# Patient Record
Sex: FEMALE
[De-identification: names, ages, dates, MRNs, and addresses within clinical notes are randomized per-mention and may not be internally consistent; named-entity substitution may affect disease eponyms.]

---

## 2017-12-27 ENCOUNTER — RESULT REVIEW (OUTPATIENT)
Age: 47
End: 2017-12-27

## 2018-01-12 ENCOUNTER — APPOINTMENT (OUTPATIENT)
Dept: OPHTHALMOLOGY | Facility: CLINIC | Age: 48
End: 2018-01-12

## 2018-01-16 ENCOUNTER — APPOINTMENT (OUTPATIENT)
Dept: OPHTHALMOLOGY | Facility: CLINIC | Age: 48
End: 2018-01-16
Payer: MEDICAID

## 2018-01-16 PROCEDURE — 92004 COMPRE OPH EXAM NEW PT 1/>: CPT

## 2018-01-16 PROCEDURE — 92225: CPT | Mod: RT

## 2018-01-16 PROCEDURE — 92015 DETERMINE REFRACTIVE STATE: CPT

## 2018-01-23 ENCOUNTER — APPOINTMENT (OUTPATIENT)
Dept: OPHTHALMOLOGY | Facility: CLINIC | Age: 48
End: 2018-01-23
Payer: MEDICAID

## 2018-01-23 PROCEDURE — 92225: CPT | Mod: LT

## 2018-01-23 PROCEDURE — 76514 ECHO EXAM OF EYE THICKNESS: CPT

## 2018-01-23 PROCEDURE — 92083 EXTENDED VISUAL FIELD XM: CPT

## 2018-01-23 PROCEDURE — 92015 DETERMINE REFRACTIVE STATE: CPT

## 2018-01-23 PROCEDURE — 92004 COMPRE OPH EXAM NEW PT 1/>: CPT

## 2018-01-23 PROCEDURE — 92133 CPTRZD OPH DX IMG PST SGM ON: CPT

## 2018-01-23 PROCEDURE — 92012 INTRM OPH EXAM EST PATIENT: CPT

## 2018-01-23 PROCEDURE — 92225: CPT | Mod: RT

## 2021-02-10 ENCOUNTER — RESULT REVIEW (OUTPATIENT)
Age: 51
End: 2021-02-10

## 2021-08-18 ENCOUNTER — RESULT REVIEW (OUTPATIENT)
Age: 51
End: 2021-08-18

## 2022-06-28 ENCOUNTER — APPOINTMENT (OUTPATIENT)
Dept: ORTHOPEDIC SURGERY | Facility: CLINIC | Age: 52
End: 2022-06-28
Payer: MEDICAID

## 2022-06-28 VITALS — BODY MASS INDEX: 32.36 KG/M2 | WEIGHT: 150 LBS | HEIGHT: 57 IN

## 2022-06-28 DIAGNOSIS — E78.00 PURE HYPERCHOLESTEROLEMIA, UNSPECIFIED: ICD-10-CM

## 2022-06-28 PROCEDURE — 99204 OFFICE O/P NEW MOD 45 MIN: CPT

## 2022-06-28 PROCEDURE — 73080 X-RAY EXAM OF ELBOW: CPT | Mod: RT

## 2022-06-28 NOTE — ASSESSMENT
[FreeTextEntry1] : The condition was explained to the patient - concern for occult radial neck fracture.\par - MRI R elbow to evaluate for occult fx.\par - removed long arm splint. sling PRN.\par - recommend OTC pain medications such as Tylenol and NSAIDs as needed, provided there are no contra-indicated medical conditions (eg liver disease, kidney disease, or GI ulcer/bleeding) or medications (eg blood thinners). Discussed possible GI and blood pressure side effects.\par - NWB to RUE.\par \par F/u after MRI.

## 2022-06-28 NOTE — IMAGING
[Right] : right elbow [de-identified] : RIGHT ELBOW\par skin intact. mild swelling of elbow.\par TTP diffusely to elbow.\par elbow ROM: extension -40, flexion 110. limited pronation, good supination. \par good wrist extension, flexion.\par good digital flexion and extension.\par Sensation intact to light touch.\par palpable radial pulse. [FreeTextEntry1] : [facility] displaced fracture or dislocation.  linear calcification of soft tissues of medial elbow.\par XR today: mild angular deformity of radial head without apparent fracture. linear calcification of soft tissues of medial elbow.

## 2022-06-28 NOTE — HISTORY OF PRESENT ILLNESS
[8] : 8 [5] : 5 [Sharp] : sharp [Intermittent] : intermittent [Rest] : rest [Meds] : meds [Lying in bed] : lying in bed [de-identified] : 6/28/22: 50yo LHD F (currently OOW s/p WC injury) presents for RIGHT elbow pain after she slipped and fell onto the RIGHT side on 6/19/22.\[facility], applied long arm splint, sling.\par Taking Motrin 600mg, Tylenol PRN.\par Denies prior injury to RUE.\par \par Hx: high cholesterol. GI ulcer. [] : no [FreeTextEntry1] : right elbow [FreeTextEntry7] : right forearm  [FreeTextEntry9] : Tylenol, Motrin, splint and sling [de-identified] : activities [de-identified] : X-ray at urgent care

## 2022-06-28 NOTE — CONSULT LETTER
[Dear  ___] : Dear  [unfilled], [Consult Letter:] : I had the pleasure of evaluating your patient, [unfilled]. [Please see my note below.] : Please see my note below. [Consult Closing:] : Thank you very much for allowing me to participate in the care of this patient.  If you have any questions, please do not hesitate to contact me. [Sincerely,] : Sincerely, [FreeTextEntry3] : Estefani Chen MD

## 2022-07-12 ENCOUNTER — APPOINTMENT (OUTPATIENT)
Dept: ORTHOPEDIC SURGERY | Facility: CLINIC | Age: 52
End: 2022-07-12

## 2022-07-12 VITALS — HEIGHT: 57 IN | WEIGHT: 150 LBS | BODY MASS INDEX: 32.36 KG/M2

## 2022-07-12 PROCEDURE — 99214 OFFICE O/P EST MOD 30 MIN: CPT

## 2022-07-13 NOTE — IMAGING
[Right] : right elbow [FreeTextEntry1] : [facility] no [facility] displaced fracture or dislocation.  linear calcification of soft tissues of medial elbow.\par XR today: mild angular deformity of radial head without apparent fracture. linear calcification of soft tissues of medial elbow. [de-identified] : RIGHT ELBOW\par skin intact. mild swelling of elbow.\par TTP diffusely to elbow.\par elbow ROM: extension -25, flexion 100. limited pronation, good supination. \par good wrist extension, flexion.\par good digital flexion and extension.\par Sensation intact to light touch.\par palpable radial pulse.

## 2022-07-13 NOTE — ASSESSMENT
[FreeTextEntry1] : - wean sling.\par - prescribed PT for R elbow. continue HEP.\par - NWB to RUE.\par \par F/u 3wks. X-rays R elbow.

## 2022-07-13 NOTE — HISTORY OF PRESENT ILLNESS
[9] : 9 [8] : 8 [Dull/Aching] : dull/aching [Sharp] : sharp [Intermittent] : intermittent [Rest] : rest [Meds] : meds [Lying in bed] : lying in bed [de-identified] : 7/12/22: f/u MRI R elbow.\par \par MRI R elbow 7/5/22 - IMPRESSION:\par 1. Acute Nondisplaced 5 mm fracture of the coronoid process of the olecranon. Acute nondepressed subchondral impaction fracture of the central and posterior capitellum. Acute nondepressed nondisplaced fracture the radial head and neck. Large joint effusion\par \par 6/28/22: 50yo LHD F (currently OOW s/p WC injury) presents for RIGHT elbow pain after she slipped and fell onto the RIGHT side on 6/19/22.\[facility], applied long arm splint, sling.\par Taking Motrin 600mg, Tylenol PRN.\par Denies prior injury to RUE.\par \par Hx: high cholesterol. GI ulcer. [] : no [FreeTextEntry1] : right elbow [FreeTextEntry9] : Tylenol, Motrin, splint and sling [de-identified] : activities and  pressure [de-identified] : X-ray and MRI

## 2022-08-02 ENCOUNTER — APPOINTMENT (OUTPATIENT)
Dept: ORTHOPEDIC SURGERY | Facility: CLINIC | Age: 52
End: 2022-08-02

## 2022-08-02 VITALS — HEIGHT: 57 IN | BODY MASS INDEX: 32.36 KG/M2 | WEIGHT: 150 LBS

## 2022-08-02 DIAGNOSIS — S52.044A NONDISPLACED FRACTURE OF CORONOID PROCESS OF RIGHT ULNA, INITIAL ENCOUNTER FOR CLOSED FRACTURE: ICD-10-CM

## 2022-08-02 DIAGNOSIS — S52.124A NONDISPLACED FRACTURE OF HEAD OF RIGHT RADIUS, INITIAL ENCOUNTER FOR CLOSED FRACTURE: ICD-10-CM

## 2022-08-02 PROCEDURE — 99213 OFFICE O/P EST LOW 20 MIN: CPT

## 2022-08-02 PROCEDURE — 73080 X-RAY EXAM OF ELBOW: CPT | Mod: RT

## 2022-08-02 NOTE — HISTORY OF PRESENT ILLNESS
[de-identified] : 8/2/22: 6wks s/p RIGHT radial head fx, coronoid fx 6/19/22. pain much better. +PT 2x/wk and performing HEP.\par \par 7/12/22: f/u MRI R elbow.\par \par MRI R elbow 7/5/22 - IMPRESSION:\par 1. Acute Nondisplaced 5 mm fracture of the coronoid process of the olecranon. Acute nondepressed subchondral impaction fracture of the central and posterior capitellum. Acute nondepressed nondisplaced fracture the radial head and neck. Large joint effusion\par \par 6/28/22: 52yo LHD F (currently OOW s/p WC injury) presents for RIGHT elbow pain after she slipped and fell onto the RIGHT side on 6/19/22.\[facility], applied long arm splint, sling.\par Taking Motrin 600mg, Tylenol PRN.\par Denies prior injury to RUE.\par \par Hx: high cholesterol. GI ulcer. [] : no [FreeTextEntry1] : R elbow  [FreeTextEntry5] : Pt here for R elbow, has seen improvement since last visit , along with doing PT two times a week \par pt states states the site is still a little tender with certain movements  [de-identified] : 08/02/2022 [de-identified] : Physical Therapy

## 2022-08-02 NOTE — IMAGING
[de-identified] : RIGHT ELBOW\par skin intact. no swelling.\par min TTP to radial head.\par good elbow extension, flexion. mild limited pronation, good supination.\par good wrist extension, flexion.\par good digital flexion and extension.\par Sensation intact to light touch.\par palpable radial pulse. [Right] : right elbow [FreeTextEntry1] : stable position/alignment of radial head fx, interval healing.

## 2022-08-02 NOTE — ASSESSMENT
[FreeTextEntry1] : - renewed PT for R elbow. continue HEP.\par - activity as tolerated.\par \par F/u 6wks. X-rays R elbow.

## 2022-09-13 ENCOUNTER — APPOINTMENT (OUTPATIENT)
Dept: ORTHOPEDIC SURGERY | Facility: CLINIC | Age: 52
End: 2022-09-13

## 2023-03-07 ENCOUNTER — APPOINTMENT (OUTPATIENT)
Dept: ORTHOPEDIC SURGERY | Facility: CLINIC | Age: 53
End: 2023-03-07
Payer: MEDICAID

## 2023-03-07 PROCEDURE — 99213 OFFICE O/P EST LOW 20 MIN: CPT

## 2023-03-07 PROCEDURE — 73130 X-RAY EXAM OF HAND: CPT | Mod: 50

## 2023-03-07 NOTE — HISTORY OF PRESENT ILLNESS
[5] : 5 [Dull/Aching] : dull/aching [Throbbing] : throbbing [Constant] : constant [Meds] : meds [Ice] : ice [Heat] : heat [Bending forward] : bending forward [Extending back] : extending back [de-identified] : 3/7/23: 53yo LHD female (disabled due to ankle injury 2015) presents for:\par - BILATERAL hand pain, weakness, and numbness/tingling, on and off since a fall down the stairs in 2015. Numbness is intermittent, occurs when using her phone and when sleeping. Dropping things. \par - unable to bend BILATERAL thumbs x 1 month. Thumb locks down when she is able to bend it.\par Denies recent injury.\par Using Lidocaine patch and IcyHot without relief.\par \par Last seen 8/2/22 for RIGHT radial head fx, coronoid fx from fall on 6/19/22\par \par Hx: GI ulcer. HLD. [] : no [FreeTextEntry5] : 52 year old F is here for Bilateral Hands, pt states having weakness by her joints on her Hands, states having issues mainly with her Bilateral thumbs, unable to bend them, at night having to wake up because her Bilateral hands are numb and tingling. swelling on the Bilateral Hands. using lidocaine patches and icy hot but no relief.

## 2023-03-07 NOTE — IMAGING
[de-identified] : LEFT HAND\par skin intact. no swelling.\par TTP to thumb A1 pulley.\par good wrist extension, flexion.\par good EPL, flicker FPL. good finger extension, flex to full fist. good finger abduction and adduction. \par SILT to median, ulnar, radial distributions.\par palpable radial pulse, brisk cap refill all digits.\par  4/5, 1st DI 5/5, APB 4+/5.\par no triggering.\par negative Tinel's at carpal tunnel. negative Phalen's test.\par \par \par RIGHT HAND\par skin intact. no swelling.\par TTP to thumb A1 pulley.\par good wrist extension, flexion.\par good EPL, flicker FPL. good finger extension, flex to full fist. good finger abduction and adduction. \par SILT to median, ulnar, radial distributions.\par palpable radial pulse, brisk cap refill all digits.\par  4/5, 1st DI 5/5, APB 5/5.\par no triggering.\par negative Tinel's at carpal tunnel. negative Phalen's test.\par \par \par XRAYS OF BILATERAL HANDS: no acute displaced fracture or dislocation.

## 2023-03-07 NOTE — ASSESSMENT
[FreeTextEntry1] : The condition was explained to the patient.\par \par Discussed risks and benefits of treatment options for tenosynovitis - activity modification, NSAID, splint, steroid injection, or surgery.\par Patient declined CSI at this time.\par \par - recommend bilateral carpal tunnel night splint.\par - activity modification PRN.\par \par F/u 1wk.\par

## 2023-03-14 ENCOUNTER — APPOINTMENT (OUTPATIENT)
Dept: ORTHOPEDIC SURGERY | Facility: CLINIC | Age: 53
End: 2023-03-14
Payer: MEDICAID

## 2023-03-14 PROCEDURE — 20550 NJX 1 TENDON SHEATH/LIGAMENT: CPT | Mod: 50

## 2023-03-14 PROCEDURE — 99214 OFFICE O/P EST MOD 30 MIN: CPT | Mod: 25

## 2023-03-14 NOTE — ASSESSMENT
[FreeTextEntry1] : Patient would like to proceed with CSI for trigger finger.\par - Discussed risks, benefits, and alternatives as well as contents of injection. Risks include, but are not limited allergic reaction, flare reaction, injection site pain, bruising, numbness, increased blood sugar, skin discoloration, fat atrophy, tendon rupture, and infection. Risk of immune suppression and increased susceptibility to infection with steroid use. We discussed that too many injections may lead to weakening of the tendon and tendon rupture. Patient expressed understanding and would like to proceed with injection.\par - The skin over the LEFT thumb A1 pulley was cleansed with alcohol and anesthetized with ethyl chloride. The flexor sheath was injected with 3mg of celestone, 0.5cc of 1% lidocaine. Site was dressed with a band-aid. Patient tolerated the procedure well.\par - The skin over the RIGHT thumb A1 pulley was cleansed with alcohol and anesthetized with ethyl chloride. The flexor sheath was injected with 3mg of celestone, 0.5cc of 1% lidocaine. Site was dressed with a band-aid. Patient tolerated the procedure well.\par - discussed that it may take up to 1 week for symptoms to improve after CSI.\par \par - bilateral carpal tunnel night splint.\par - activity modification PRN.\par \par F/u 4wks.

## 2023-03-14 NOTE — HISTORY OF PRESENT ILLNESS
[7] : 7 [Dull/Aching] : dull/aching [Throbbing] : throbbing [Constant] : constant [Meds] : meds [Ice] : ice [Heat] : heat [Bending forward] : bending forward [Extending back] : extending back [de-identified] : 3/14/23:\par - f/u BILATERAL CTS. ordered night splints, should arrive today.\par - f/u BILATERAL trigger thumb. pain worse. dropping things.\par \par 3/7/23: 53yo LHD female (disabled due to ankle injury 2015) presents for:\par - BILATERAL hand pain, weakness, and numbness/tingling, on and off since a fall down the stairs in 2015. Numbness is intermittent, occurs when using her phone and when sleeping. Dropping things. \par - unable to bend BILATERAL thumbs x 1 month. Thumb locks down when she is able to bend it.\par Denies recent injury.\par Using Lidocaine patch and IcyHot without relief.\par \par Last seen 8/2/22 for RIGHT radial head fx, coronoid fx from fall on 6/19/22\par \par Hx: GI ulcer. HLD. [] : no [FreeTextEntry1] : bilateral hand [FreeTextEntry5] : AGUSTO is here today for bilateral hand pain. she states is worse since last visit. she is here today for b/l CSI.

## 2023-03-14 NOTE — IMAGING
[de-identified] : LEFT HAND\par skin intact. no swelling.\par TTP to thumb A1 pulley.\par good wrist extension, flexion.\par good EPL, flicker FPL. good finger extension, flex to full fist. good finger abduction and adduction. \par SILT to median, ulnar, radial distributions.\par palpable radial pulse, brisk cap refill all digits.\par  4/5, 1st DI 5/5, APB 4+/5.\par no triggering.\par negative Tinel's at carpal tunnel. negative Phalen's test.\par \par \par RIGHT HAND\par skin intact. no swelling.\par TTP to thumb A1 pulley.\par good wrist extension, flexion.\par good EPL, flicker FPL. good finger extension, flex to full fist. good finger abduction and adduction. \par SILT to median, ulnar, radial distributions.\par palpable radial pulse, brisk cap refill all digits.\par  4/5, 1st DI 5/5, APB 5/5.\par no triggering.\par negative Tinel's at carpal tunnel. negative Phalen's test.

## 2023-04-11 ENCOUNTER — APPOINTMENT (OUTPATIENT)
Dept: ORTHOPEDIC SURGERY | Facility: CLINIC | Age: 53
End: 2023-04-11
Payer: MEDICAID

## 2023-04-11 PROCEDURE — 99213 OFFICE O/P EST LOW 20 MIN: CPT

## 2023-04-11 PROCEDURE — 95864 NEEDLE EMG 4 EXTREMITIES: CPT

## 2023-04-11 NOTE — IMAGING
[de-identified] : LEFT HAND\par skin intact. no swelling.\par no TTP.\par good wrist extension, flexion.\par good EPL, FPL. good finger extension, flex to full fist. good finger abduction and adduction. \par SILT to median, ulnar, radial distributions.\par palpable radial pulse, brisk cap refill all digits.\par  4/5, 1st DI 5/5, APB 4+/5.\par + clicking with thumb IPJ flexion.\par + Tinel's at carpal tunnel.\par \par \par RIGHT HAND\par skin intact. no swelling.\par TTP to thumb A1 pulley.\par good wrist extension, flexion.\par good EPL, flicker FPL. good finger extension, flex to full fist. good finger abduction and adduction. \par SILT to median, ulnar, radial distributions.\par palpable radial pulse, brisk cap refill all digits.\par  4/5, 1st DI 5/5, APB 5/5.\par no triggering.\par negative Tinel's at carpal tunnel. negative Phalen's test.

## 2023-04-11 NOTE — ASSESSMENT
[FreeTextEntry1] : - EDX to evaluate CTS.\par - continue carpal tunnel night splint.\par - activity modification PRN.\par \par F/u after EDX. She will bring the results of the EDX.

## 2023-04-11 NOTE — HISTORY OF PRESENT ILLNESS
[Tightness] : tightness [de-identified] : 4/11/23:\par - f/u BILATERAL CTS. wearing night splint. continues to have numbness most of the day and at night.\par - f/u BILATERAL trigger thumb. s/p CSI on 3/14/23. LEFT side is much better, RIGHT side still painful and locking.\par \par 3/14/23:\par - f/u BILATERAL CTS. ordered night splints, should arrive today.\par - f/u BILATERAL trigger thumb. pain worse. dropping things.\par \par 3/7/23: 53yo LHD female (disabled due to ankle injury 2015) presents for:\par - BILATERAL hand pain, weakness, and numbness/tingling, on and off since a fall down the stairs in 2015. Numbness is intermittent, occurs when using her phone and when sleeping. Dropping things. \par - unable to bend BILATERAL thumbs x 1 month. Thumb locks down when she is able to bend it.\par Denies recent injury.\par Using Lidocaine patch and IcyHot without relief.\par \par Last seen 8/2/22 for RIGHT radial head fx, coronoid fx from fall on 6/19/22\par \par Hx: GI ulcer. HLD. [] : no [FreeTextEntry1] : bilateral hands  [FreeTextEntry5] : AGUSTO a 52 year y/o female is here today for bilateral hand follow up. left thumb pain decreased, right thumb pain is the same, locking at times. pain with bending. N/T in bilateral hands.

## 2023-06-06 ENCOUNTER — APPOINTMENT (OUTPATIENT)
Dept: ORTHOPEDIC SURGERY | Facility: CLINIC | Age: 53
End: 2023-06-06

## 2023-06-20 ENCOUNTER — APPOINTMENT (OUTPATIENT)
Dept: ORTHOPEDIC SURGERY | Facility: CLINIC | Age: 53
End: 2023-06-20
Payer: MEDICAID

## 2023-06-20 PROCEDURE — 99213 OFFICE O/P EST LOW 20 MIN: CPT

## 2023-06-20 NOTE — HISTORY OF PRESENT ILLNESS
[de-identified] : 6/20/23: \par - f/u EDX. f/u BILATERAL CTS. wearing night splint. reports numbness is better, has mild tingling at night.\par - f/u BILATERAL trigger thumb. s/p CSI on 3/14/23.\par Reports that she was recently hospitalized for pneumonia/bronchitis/asthma, for which she was prescribed a 7 day course of prednisone, which has improved the pain in her thumbs. Completed prednisone yesterday.  Able to perform ADLs and lift things.\par \par EDX 5/15/23 - IMPRESSION: \par - L median: DML 3.0ms, DSL 2.7ms.\par - R median: DML 2.7ms, DSL 2.5ms.\par - L ulnar: 77m/s above to below elbow, 49m/s below elbow to wrist.\par - R ulnar: 91m/s above to below elbow, 51m/s below elbow to wrist.\par \par 4/11/23:\par - f/u BILATERAL CTS. wearing night splint. continues to have numbness most of the day and at night.\par - f/u BILATERAL trigger thumb. s/p CSI on 3/14/23. LEFT side is much better, RIGHT side still painful and locking.\par \par 3/14/23:\par - f/u BILATERAL CTS. ordered night splints, should arrive today.\par - f/u BILATERAL trigger thumb. pain worse. dropping things.\par \par 3/7/23: 53yo LHD female (disabled due to ankle injury 2015) presents for:\par - BILATERAL hand pain, weakness, and numbness/tingling, on and off since a fall down the stairs in 2015. Numbness is intermittent, occurs when using her phone and when sleeping. Dropping things. \par - unable to bend BILATERAL thumbs x 1 month. Thumb locks down when she is able to bend it.\par Denies recent injury.\par Using Lidocaine patch and IcyHot without relief.\par \par Last seen 8/2/22 for RIGHT radial head fx, coronoid fx from fall on 6/19/22\par \par Hx: GI ulcer. HLD. [] : no [FreeTextEntry1] : BILATERAL hand [FreeTextEntry5] : AGUSTO is here today for EMG results. pt states she was recently hospitalized (5 days) for pneumonia, given prednisone which helped relieve symptoms in thumbs. she is using night splint which helps with N/T.

## 2023-06-20 NOTE — IMAGING
[de-identified] : LEFT HAND\par skin intact. no swelling.\par mild TTP to thumb A1 pulley.\par good wrist extension, flexion.\par good EPL, FPL. good finger extension, flex to full fist. good finger abduction and adduction. \par SILT to median, ulnar, radial distributions.\par palpable radial pulse, brisk cap refill all digits.\par  4+/5, 1st DI 5/5, APB 4+/5.\par + clicking with thumb IPJ flexion.\par negative Tinel's at carpal tunnel.\par \par \par RIGHT HAND\par skin intact. no swelling.\par mild TTP to thumb A1 pulley.\par good wrist extension, flexion.\par good EPL, flicker FPL. good finger extension, flex to full fist. good finger abduction and adduction. \par SILT to median, ulnar, radial distributions.\par palpable radial pulse, brisk cap refill all digits.\par  4+/5, 1st DI 5/5, APB 5/5.\par + clicking with thumb IPJ flexion.\par negative Tinel's at carpal tunnel.

## 2023-06-20 NOTE — ASSESSMENT
[FreeTextEntry1] : EDX reviewed with patient.\par - continue carpal tunnel night splint.\par - activity modification PRN.\par \par F/u if symptoms persist.

## 2023-07-26 NOTE — REVIEW OF SYSTEMS
[Joint Pain] : joint pain [Joint Swelling] : joint swelling [Negative] : Heme/Lymph [Joint Stiffness] : no joint stiffness colon surgery

## 2023-10-03 ENCOUNTER — APPOINTMENT (OUTPATIENT)
Dept: ORTHOPEDIC SURGERY | Facility: CLINIC | Age: 53
End: 2023-10-03
Payer: MEDICAID

## 2023-10-03 PROCEDURE — 20526 THER INJECTION CARP TUNNEL: CPT | Mod: RT

## 2023-10-03 PROCEDURE — 73080 X-RAY EXAM OF ELBOW: CPT | Mod: RT

## 2023-10-03 PROCEDURE — 99214 OFFICE O/P EST MOD 30 MIN: CPT | Mod: 25

## 2023-10-17 ENCOUNTER — APPOINTMENT (OUTPATIENT)
Dept: ORTHOPEDIC SURGERY | Facility: CLINIC | Age: 53
End: 2023-10-17
Payer: MEDICAID

## 2023-10-17 DIAGNOSIS — M77.8 OTHER ENTHESOPATHIES, NOT ELSEWHERE CLASSIFIED: ICD-10-CM

## 2023-10-17 DIAGNOSIS — M65.312 TRIGGER THUMB, LEFT THUMB: ICD-10-CM

## 2023-10-17 DIAGNOSIS — G56.01 CARPAL TUNNEL SYNDROME, RIGHT UPPER LIMB: ICD-10-CM

## 2023-10-17 DIAGNOSIS — G56.02 CARPAL TUNNEL SYNDROME, LEFT UPPER LIMB: ICD-10-CM

## 2023-10-17 DIAGNOSIS — M65.311 TRIGGER THUMB, RIGHT THUMB: ICD-10-CM

## 2023-10-17 PROCEDURE — 99213 OFFICE O/P EST LOW 20 MIN: CPT

## 2023-11-08 ENCOUNTER — APPOINTMENT (OUTPATIENT)
Dept: ORTHOPEDIC SURGERY | Facility: CLINIC | Age: 53
End: 2023-11-08
Payer: MEDICAID

## 2023-11-08 DIAGNOSIS — Z00.00 ENCOUNTER FOR GENERAL ADULT MEDICAL EXAMINATION W/OUT ABNORMAL FINDINGS: ICD-10-CM

## 2023-11-08 DIAGNOSIS — M25.521 PAIN IN RIGHT ELBOW: ICD-10-CM

## 2023-11-08 PROCEDURE — 99204 OFFICE O/P NEW MOD 45 MIN: CPT

## 2023-11-08 PROCEDURE — 99214 OFFICE O/P EST MOD 30 MIN: CPT

## 2023-11-08 PROCEDURE — 73030 X-RAY EXAM OF SHOULDER: CPT | Mod: RT

## 2023-12-13 ENCOUNTER — APPOINTMENT (OUTPATIENT)
Dept: ORTHOPEDIC SURGERY | Facility: CLINIC | Age: 53
End: 2023-12-13
Payer: MEDICAID

## 2023-12-13 DIAGNOSIS — M75.101 UNSPECIFIED ROTATOR CUFF TEAR OR RUPTURE OF RIGHT SHOULDER, NOT SPECIFIED AS TRAUMATIC: ICD-10-CM

## 2023-12-13 DIAGNOSIS — M75.41 IMPINGEMENT SYNDROME OF RIGHT SHOULDER: ICD-10-CM

## 2023-12-13 DIAGNOSIS — M75.111 INCOMPLETE ROTATOR CUFF TEAR OR RUPTURE OF RIGHT SHOULDER, NOT SPECIFIED AS TRAUMATIC: ICD-10-CM

## 2023-12-13 DIAGNOSIS — M75.21 BICIPITAL TENDINITIS, RIGHT SHOULDER: ICD-10-CM

## 2023-12-13 PROCEDURE — 99214 OFFICE O/P EST MOD 30 MIN: CPT

## 2023-12-13 NOTE — DISCUSSION/SUMMARY
[de-identified] : Assessment:   The patient presents with history, examination and imaging that are most consistent with a diagnosis of:  Nontraumatic incomplete tear of right rotator cuff, rotator cuff syndrome of right shoulder   The patient would like to pursue conservative measures at this time. After consideration of various non-operative treatment modalities, the patient would like to proceed with the modalities listed below.   During this appointment the patient was examined, diagnoses were discussed and explained in a face to face manner. In addition extensive time was spent reviewing aforementioned diagnostic studies. Counseling including abnormal image results, differential diagnoses, treatment options, risk and benefits, lifestyle changes, current condition, and current medications was performed. Patient's comments, questions, and concerns were address and patient verbalized understanding.   ---------------------------     Plan: - Continue with Physical Therapy, new script provided     Follow-up:  as needed

## 2023-12-13 NOTE — IMAGING
[de-identified] : The patient is a well appearing 53 year  old female of their stated age.   General: in no acute distress, seated comfortably, moving easily Skin: No discoloration, rashes; on palpation skin is dry,  Neuro: Normal sensation all dermatomes, motor all myotomes Vascular: Normal pulses, no edema, normal temperature Coordination and balance: Normal Psych: normal mood and affect, non pressured speech, alert and oriented x3  Musculoskeletal:  Neck is supple & nontender to palpation. Negative Spurling's test.   Right Shoulder:       	   ROM:  Forward Flexion: 180 degrees  Abduction: 180 degrees  ER at 90: 90 degrees  IR at 90: 45 degrees  ER at N: 50 degrees   Motor:  Abduction: 5 out of 5  FPS: 5 out of 5  Flexion: 5 out of 5  Internal Rotation: 5 out of 5  External Rotation: 5 out of 5   Provocative Testing:  Impingement: Negative  [place]'s: Negative  Other: N/A   Left Shoulder:  ROM:  Forward Flexion: 180 degrees  Abduction: 180 degrees  ER at 90: 90 degrees  IR at 90: 45 degrees  ER at N: 50 degrees   Motor:  Abduction: 5 out of 5  FPS: 5 out of 5  Flexion: 5 out of 5  Internal Rotation: 5 out of 5  External Rotation: 5 out of 5   Provocative Testing:  Impingement: Negative  [place]'s: Negative  Other: N/A   Effected Elbow:   ROM:  Flexion: 0-145 degrees  Supination: 90 degrees  Pronation: 90 degrees   Inspection:  Erythema: None  Ecchymosis: None  Abrasions: None  Effusion: None  Deformity: None   Palpation:  Crepitus: None  Medial Epicondyle/Flexor-Pronator: Nontender  Lateral Epicondyle/ECRB: Swelling Olecranon: Nontender  Radial Head: Nontender  Distal Biceps: Tenderness  Distal Triceps:  Nontender  Flexor-Pronator: Nontender  Extensor/ECRB: Nontender  UCL: Nontender  Pronator Intersection: Nontender  Ulnar Nerve:  Stable & Nontender   Stress Testing:  Varus at 0 Degrees: Stable  Varus at 30 Degrees: Stable  Valgus at 0 Degrees: Stable  Valgus at 30 Degrees: Stable   Motor:  Elbow Flexion: 5 out of 5  Elbow Extension: 5 out of 5  Supination: 5 out of 5  Pronation: 5 out of 5  Wrist Flexion: 5 out of 5  Wrist Extension: 5 out of 5  Interossei: 5 out of 5  : 5 out of 5   Provocative Testing:  Milking: Negative  Moving Valgus Stress: Negative  Posterolateral R-I: Negative  Hook Test: Negative  Resisted Wrist Extension: No Pain  Resisted Index Finger Extension: No Pain  Resisted Middle Finger Extension: No Pain  Resisted Wrist Flexion: No Pain  Resisted Pronation: No Pain   Neurologic Exam:  Axillary Nerve:  SLT  Radial Nerve: SLT  Median Nerve: SLT  Ulnar Nerve:  SLT   Other:  N/A   Vascular Exam:  Radial Pulse: 2+  Ulnar Pulse: 2+  Capillary Refill: <2 Seconds   Other Exams: None   Pertinent Contralateral Elbow Findings: None     The patient is a well appearing 53 year year old female of their stated age. Neck is supple & nontender to palpation. Negative Spurling's test.   General: in no acute distress, seated comfortably, moving easily Skin: No discoloration, rashes; on palpation skin is dry, Neuro: Normal sensation all dermatomes, motor all myotomes Vascular: Normal pulses, no edema, normal temperature Coordination and balance: Normal Psych: normal mood and affect, non pressured speech, alert and oriented x3   RIGHT Shoulder  Inspection: Scapula Winging: Negative Deformity: None Erythema: None Ecchymosis: None Abrasions: None Effusion: None   Range of Motion: Active Forward Flexion: 160 degrees Passive Forward Flexion: 170 degrees Active IR : L4 Passive ER : 30 degrees   Motor Exam: Forward Flexion: 4+ out of 5 Flexion Plane of Scapula: 5 out of 5 Abduction: 4+ out of 5 Internal Rotation: 5 out of 5 External Rotation: 4+ out of 5 Distal Motor Strength: 5 out of 5   Stability Testing: Anterior: 1+ Posterior: 1+ Sulcus N: 1+ Sulcus ER: 1+   Provocative Tests: Drop Arm: Negative Garner/Impingement: Positive [place]: Positive X-Arm Adduction: Negative Belly Press: Negative Bear Hug: Negative Lift Off: Negative Apprehension: Negative Relocation: Negative Posterior Load & Shift: Negative   Palpation: AC Joint: TTP Clavicle: Nontender SC Joint: Nontender Bicipital Groove: TTP Coracoid Process: Nontender Pectoralis Minor Tendon: Nontender Pectoralis Major Tendon: Nontender & palpably intact Latissimus Dorsi: Nontender Proximal Humerus: Nontender Scapula Body: Nontender Medial Scapula Border: Nontender Scapula Spine: Nontender   Neurologic Exam:  Sensation to Light Touch Axillary: Grossly intact Ulnar: Grossly intact Radial: Grossly intact Median: Grossly intact   Circulatory/Pulses: Ulnar: 2+ Radial: 2+ CR < 2s   X-RAYS of the RIGHT shoulder (AP, SCAPULAR Y, AND AXILLARY VIEWS): no fracture or dislocation; Bigliani Type 2 acromion; mild degenerative change at ACJ; subacromial spurring

## 2023-12-13 NOTE — DATA REVIEWED
[MRI] : MRI [Right] : of the right [Shoulder] : shoulder [Elbow] : elbow [Report was reviewed and noted in the chart] : The report was reviewed and noted in the chart [I independently reviewed and interpreted images and report] : I independently reviewed and interpreted images and report [FreeTextEntry1] : @[facility] RIGHT shoulder MRI 11/15/23 1. Moderate diffuse rotator cuff tendinosis. Small low grade interstitial bertha of the supraspinatus tenson insertion and mild subacromial bursitis. 2. Multifocal capsular inflammation. 3. Mild OA at the AC joint. 4. Mild nonspecific teres minor muscle atrophy.   RIGHT ELBOW MRI 1. Posttraumatic healed fx of the radial head, capitalium. coronoid process with interval resolution of the bone marrow edema. 2. 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

## 2023-12-13 NOTE — HISTORY OF PRESENT ILLNESS
[7] : 7 [8] : 8 [Dull/Aching] : dull/aching [Radiating] : radiating [Stabbing] : stabbing [Constant] : constant [Leisure] : leisure [Work] : work [Sleep] : sleep [Social interactions] : social interactions [Physical therapy] : physical therapy [de-identified] : 11/8/23: AGUSTO KEITH is a 53 year old female here today complaining of RIGHT elbow and RIGHT shoulder pain. Reports injury in RT shoulder 1 year ago, completed PT, was doing fine. 1 month ago, was pushing a cart of laundry and felt severe pain in R elbow and shoulder. Pain radiates from elbow to shoulder to L-trap. Symptoms have slightly improved. Seeing Dr. Chen for hand problem, prescribed PT for elbow, has completed about of month, states helps but is having frequent flares ups, especially in the morning. Limited ROM. Taking Gabapentin.    12/13/23: AGUSTO KEITH is here for an MRI review of her RIGHT shoulder and elbow. Attending PT for both, helping. Yesterday after doing laundry started feeling pain in left shoulder and elbow. Overall symptoms improving since last visit [] : no [FreeTextEntry1] : RIGHT elbow and RIGHT shoulder  [FreeTextEntry7] : RT shoulder [de-identified] : Lifting arm above head [de-identified] : 10/17/23 [de-identified] : Gustavo [de-identified] : 10/2023 [de-identified] : xrays (ELBOW)